# Patient Record
(demographics unavailable — no encounter records)

---

## 2025-01-03 NOTE — HISTORY OF PRESENT ILLNESS
[de-identified] : 73 year old male presents for ear evaluation Co of b/l ear plugging No pain, changes to hearing, no tinnitus usually hearing well

## 2025-01-03 NOTE — PROCEDURE
[Cerumen Impaction] : Cerumen Impaction [FreeTextEntry6] : Indication: ear plugging and discomfort Large amount cerumen cleared left and right ear instrumentation with curettes, forceps and suction. Ear canals and tympanic membranes  unremarkable. narrow eac

## 2025-01-03 NOTE — ASSESSMENT
[FreeTextEntry1] : canal stenosis no further nosebleeds cerumen cleared au f/u 1 y  20 minutes spent with patient with exam and counseling excluding time for any procedure.